# Patient Record
Sex: MALE | Race: WHITE | Employment: OTHER | URBAN - METROPOLITAN AREA
[De-identification: names, ages, dates, MRNs, and addresses within clinical notes are randomized per-mention and may not be internally consistent; named-entity substitution may affect disease eponyms.]

---

## 2019-07-02 ENCOUNTER — OFFICE VISIT (OUTPATIENT)
Dept: ONCOLOGY | Age: 69
End: 2019-07-02

## 2019-07-02 ENCOUNTER — HOSPITAL ENCOUNTER (OUTPATIENT)
Dept: INFUSION THERAPY | Age: 69
Discharge: HOME | End: 2019-07-02
Payer: MEDICARE

## 2019-07-02 ENCOUNTER — DOCUMENTATION ONLY (OUTPATIENT)
Dept: ONCOLOGY | Age: 69
End: 2019-07-02

## 2019-07-02 VITALS
WEIGHT: 149.8 LBS | DIASTOLIC BLOOD PRESSURE: 99 MMHG | HEIGHT: 68 IN | BODY MASS INDEX: 22.7 KG/M2 | OXYGEN SATURATION: 98 % | RESPIRATION RATE: 16 BRPM | TEMPERATURE: 98.9 F | SYSTOLIC BLOOD PRESSURE: 153 MMHG | HEART RATE: 106 BPM

## 2019-07-02 VITALS
RESPIRATION RATE: 18 BRPM | HEART RATE: 81 BPM | OXYGEN SATURATION: 97 % | DIASTOLIC BLOOD PRESSURE: 100 MMHG | SYSTOLIC BLOOD PRESSURE: 166 MMHG | TEMPERATURE: 97.4 F

## 2019-07-02 DIAGNOSIS — C65.1 RENAL PELVIS TRANSITIONAL CELL MALIGNANT NEOPLASM, RIGHT (HCC): Primary | ICD-10-CM

## 2019-07-02 DIAGNOSIS — N18.9 CHRONIC KIDNEY DISEASE, UNSPECIFIED CKD STAGE: ICD-10-CM

## 2019-07-02 DIAGNOSIS — Z21 ASYMPTOMATIC HIV INFECTION (HCC): ICD-10-CM

## 2019-07-02 LAB
ALBUMIN SERPL-MCNC: 4.2 G/DL (ref 3.5–5)
ALBUMIN/GLOB SERPL: 1.2 {RATIO} (ref 1.1–2.2)
ALP SERPL-CCNC: 63 U/L (ref 45–117)
ALT SERPL-CCNC: 37 U/L (ref 12–78)
ANION GAP SERPL CALC-SCNC: 7 MMOL/L (ref 5–15)
AST SERPL-CCNC: 27 U/L (ref 15–37)
BASOPHILS # BLD: 0 K/UL (ref 0–0.1)
BASOPHILS NFR BLD: 1 % (ref 0–1)
BILIRUB SERPL-MCNC: 0.5 MG/DL (ref 0.2–1)
BUN SERPL-MCNC: 27 MG/DL (ref 6–20)
BUN/CREAT SERPL: 15 (ref 12–20)
CALCIUM SERPL-MCNC: 10 MG/DL (ref 8.5–10.1)
CHLORIDE SERPL-SCNC: 104 MMOL/L (ref 97–108)
CO2 SERPL-SCNC: 28 MMOL/L (ref 21–32)
CREAT SERPL-MCNC: 1.83 MG/DL (ref 0.7–1.3)
DIFFERENTIAL METHOD BLD: ABNORMAL
EOSINOPHIL # BLD: 0.1 K/UL (ref 0–0.4)
EOSINOPHIL NFR BLD: 2 % (ref 0–7)
ERYTHROCYTE [DISTWIDTH] IN BLOOD BY AUTOMATED COUNT: 13.8 % (ref 11.5–14.5)
GLOBULIN SER CALC-MCNC: 3.6 G/DL (ref 2–4)
GLUCOSE SERPL-MCNC: 101 MG/DL (ref 65–100)
HCT VFR BLD AUTO: 41.1 % (ref 36.6–50.3)
HGB BLD-MCNC: 13.8 G/DL (ref 12.1–17)
IMM GRANULOCYTES # BLD AUTO: 0 K/UL (ref 0–0.04)
IMM GRANULOCYTES NFR BLD AUTO: 0 % (ref 0–0.5)
LYMPHOCYTES # BLD: 1.9 K/UL (ref 0.8–3.5)
LYMPHOCYTES NFR BLD: 36 % (ref 12–49)
MCH RBC QN AUTO: 32.5 PG (ref 26–34)
MCHC RBC AUTO-ENTMCNC: 33.6 G/DL (ref 30–36.5)
MCV RBC AUTO: 96.7 FL (ref 80–99)
MONOCYTES # BLD: 0.6 K/UL (ref 0–1)
MONOCYTES NFR BLD: 12 % (ref 5–13)
NEUTS SEG # BLD: 2.7 K/UL (ref 1.8–8)
NEUTS SEG NFR BLD: 49 % (ref 32–75)
NRBC # BLD: 0 K/UL (ref 0–0.01)
NRBC BLD-RTO: 0 PER 100 WBC
PLATELET # BLD AUTO: 199 K/UL (ref 150–400)
PMV BLD AUTO: 8.6 FL (ref 8.9–12.9)
POTASSIUM SERPL-SCNC: 4 MMOL/L (ref 3.5–5.1)
PROT SERPL-MCNC: 7.8 G/DL (ref 6.4–8.2)
RBC # BLD AUTO: 4.25 M/UL (ref 4.1–5.7)
SODIUM SERPL-SCNC: 139 MMOL/L (ref 136–145)
WBC # BLD AUTO: 5.4 K/UL (ref 4.1–11.1)

## 2019-07-02 PROCEDURE — 85025 COMPLETE CBC W/AUTO DIFF WBC: CPT

## 2019-07-02 PROCEDURE — 36415 COLL VENOUS BLD VENIPUNCTURE: CPT

## 2019-07-02 PROCEDURE — 80053 COMPREHEN METABOLIC PANEL: CPT

## 2019-07-02 RX ORDER — EMTRICITABINE, RILPIVIRINE HYDROCHLORIDE, AND TENOFOVIR ALAFENAMIDE 200; 25; 25 MG/1; MG/1; MG/1
TABLET ORAL
Refills: 11 | COMMUNITY
Start: 2019-06-26

## 2019-07-02 NOTE — PROGRESS NOTES
[facility] at [address], [address] [place]: [phone]  F: [phone]      Reason for Visit:   Alma Sena is a 70 y.o. male who is seen in consultation at the request of Dr. Vianey Abdi for evaluation of urothelial cell carcinoma of right renal pelvis. Treatment History:   · Right ureteroscopy 12/18/2018: low grade non-invasive urothelial carcinoma  · Right ureteroscopy 4/10/2019: large volume of recurrent tumor throughout lower poly calyces, pathology with urothelial cell carcinoma, low grade  · Laparoscopic right nephroureteroectomy 5/1/2019: urothelial carcinoma, high-grade, 3.2cm, negative margins, no nodes samples  · Stage III (pT3 cN0 M0) urothelial carcinoma of right renal pelvis    History of Present Illness:   Alma Sena is a pleasant 70 y.o. male who presents today for evaluation of urothelial carcinoma. He reports back in October developing diarrhea and abdominal pain while traveling to [place] and [place]. He figured it was something he ate, but the symptoms only worsened when he returned to the [address] [place] [address]. A CT scan revealed diverticulitis, which was treated, and his symptoms resolved. About a month later he received a call that the CT also noted some abnormality in his kidney, and he was referred to urology. A ureteroscopy revealed a low-grade non-invasive carcinoma, and he was placed on close surveillance. Follow up ureteroscopy demonstrated new large volume disease, now invasive. He then had a right nephroureterectomy which revealed high grade disease. He took some time off after surgery to travel to [facility], including a week long voyage on the [address] [place] [address]. He is now here to discuss further options for management. He reports feeling well. No pain. Energy has been fair. No blood in urine.     He notes seeing pulmonary in consultation after his CT scan done last year, due to an abnormality in his lung that was determined to be secondary to prior infection. He reports a history of HIV, managed by Dr. Rolan Smith at [facility]. He reports his viral load has been undetectable recently. He is accompanied by his  today. He lives [place] in the [facility]. He is a retired . Past Medical History:   Diagnosis Date    HIV (human immunodeficiency virus infection) ([address]     Renal pelvis transitional cell malignant neoplasm, right [facility])       Past Surgical History:   Procedure Laterality Date    HX NEPHRECTOMY Right 05/01/2019      Social History     Tobacco Use    Smoking status: Never Smoker    Smokeless tobacco: Never Used   Substance Use Topics    Alcohol use: Yes     Alcohol/week: 4.2 oz     Types: 7 Glasses of wine per week      History reviewed. No pertinent family history. Current Outpatient Medications   Medication Sig    ODEFSEY 200-25-25 mg tab TAKE 1 TABLET BY MOUTH ONCE DAILY     No current facility-administered medications for this visit. Allergies   Allergen Reactions    Mushroom Nausea and Vomiting        Review of Systems: A complete review of systems was obtained, negative except as described above. Physical Exam:     Visit Vitals  BP (!) 153/99 (BP 1 Location: Left arm, BP Patient Position: Sitting)   Pulse (!) 106   Temp 98.9 °F (37.2 °C) (Oral)   Resp 16   Ht 5' 8\" (1.727 m)   Wt 149 lb 12.8 oz (67.9 kg)   SpO2 98%   BMI 22.78 kg/m²     ECOG PS: 0  General: No distress  Eyes: PERRLA, anicteric sclerae  HENT: Atraumatic, OP clear  Neck: Supple  Lymphatic: No cervical, supraclavicular, or inguinal adenopathy  Respiratory: CTAB, normal respiratory effort  CV: Normal rate, regular rhythm, no murmurs, no peripheral edema  GI: Soft, nontender, nondistended, no masses, no hepatomegaly, no splenomegaly  MS: Normal gait and station. Digits without clubbing or cyanosis. Skin: No rashes, ecchymoses, or petechiae. Normal temperature, turgor, and texture.   Psych: Alert, oriented, appropriate affect, normal judgment/insight    Results:     Lab Results   Component Value Date/Time    WBC 5.4 07/02/2019 03:52 PM    HGB 13.8 07/02/2019 03:52 PM    HCT 41.1 07/02/2019 03:52 PM    PLATELET 416 15/69/8379 03:52 PM    MCV 96.7 07/02/2019 03:52 PM    ABS. NEUTROPHILS 2.7 07/02/2019 03:52 PM     Lab Results   Component Value Date/Time    Sodium 139 07/02/2019 03:52 PM    Potassium 4.0 07/02/2019 03:52 PM    Chloride 104 07/02/2019 03:52 PM    CO2 28 07/02/2019 03:52 PM    Glucose 101 (H) 07/02/2019 03:52 PM    BUN 27 (H) 07/02/2019 03:52 PM    Creatinine 1.83 (H) 07/02/2019 03:52 PM    GFR est AA 45 (L) 07/02/2019 03:52 PM    GFR est non-AA 37 (L) 07/02/2019 03:52 PM    Calcium 10.0 07/02/2019 03:52 PM     Lab Results   Component Value Date/Time    Bilirubin, total 0.5 07/02/2019 03:52 PM    ALT (SGPT) 37 07/02/2019 03:52 PM    AST (SGOT) 27 07/02/2019 03:52 PM    Alk. phosphatase 63 07/02/2019 03:52 PM    Protein, total 7.8 07/02/2019 03:52 PM    Albumin 4.2 07/02/2019 03:52 PM    Globulin 3.6 07/02/2019 03:52 PM         6/5/2019  Creatinine: 1.83    Records reviewed and summarized above. Pathology report(s) reviewed above. Radiology report(s) reviewed above. Assessment:   1) Urothelial carcinoma of right renal pelvis  Stage III  He is s/p right nephroureterectomy 5/1/2019. Management is with curative intent. He was found to have a high-grade T3 tumor, which certainly increases his risk of recurrence. We discussed the potential benefit of adjuvant chemotherapy, which is supported as an option by NCCN guidelines for patients with high risk upper tract urothelial carcinoma. Evidence from POUT trial presented in 2018 demonstrated a 20% improvement in 2-year DFS, though this was a small study which has not yet been published and has not yet firmly changed standard of care in the [address].     We spoke about adjuvant chemotherapy at great length, and he expressed an interest in moving forward with this. However, I recommended first obtaining labwork to ensure adequate renal function. Unfortunately, after he left his creatinine returned elevated today at 1.83. His creatinine was the same level when checked by urology on 6/5, suggesting this is his new baseline post-nephrectomy. In light of this finding, I do not think he is a good candidate for adjuvant cisplatin based therapy. There is not good evidence for carboplatin in this setting, and there is some concern about toxicity of carboplatin given his solitary kidney and his HIV diagnosis. I reviewed his case with a urologic oncology expert in [facility], and he agrees that chemotherapy is not indicated in this situation. I called the patient and reviewed all of this with him. I recommend he proceed with surveillance with Dr. Summer Sherman. He should obtain follow up CT imaging sometime soon, as he has not had any imaging since late last year. If he is found to have metastatic disease, he should return to see me to discuss palliative chemotherapy or immunotherapy. 2) HIV  Managed by Dr. Rolo Alberto at [address] [place] [address]    3) CKD  Creatinine of 1.83 post-nephrectomy. Consider nephrology evaluation, reviewed with Dr. Summer Sherman and he will initiate. Plan:     · Labs today: CBC, CMP  · CT C/A/P sometime soon, [facility]  · Follow up with Dr. Summer Sherman for surveillance  · Nephrology referral (urology to initiate)  ·  to meet with patient today, provide resources and counseling  · Return to see me as needed    I appreciate the opportunity to participate in Mr. Mauro ortiz.     Signed By: Bea Castellon MD

## 2019-07-02 NOTE — PROGRESS NOTES
[facility] Lab Visit:    0632 Pt arrived ambulatory and in no distress, labs drawn one stick 1552 in Right AC PER [facility]  . blood pressure high just check at doctors prior  Departed [facility] ambulatory and in no distress. Visit Vitals  BP (!) 166/100 (BP 1 Location: Right arm, BP Patient Position: Sitting)   Pulse 81   Temp 97.4 °F (36.3 °C)   Resp 18   SpO2 97%       Labs available in CC once resulted.

## 2019-07-02 NOTE — PROGRESS NOTES
1. Have you been to the ER, urgent care clinic since your last visit? Hospitalized since your last visit? No    2. Have you seen or consulted any other health care providers outside of the [address] [place] [address] since your last visit? Include any pap smears or colon screening.  No

## 2019-07-03 ENCOUNTER — HOSPITAL ENCOUNTER (OUTPATIENT)
Dept: INFUSION THERAPY | Age: 69
End: 2019-07-03

## 2019-07-03 NOTE — PROGRESS NOTES
Oncology Navigator  Psychosocial Assessment    Reason for Assessment:    []Depression  []Anxiety  []Caregiver [place]  []Maladaptive Coping with Serious Illness   [] Social Work Referral [x] Initial Assessment  [x] Other newly dx renal cell carcinoma    Sources of Information:    [x]Patient  [x]Family  [x]Staff  [x]Medical Record    Advance Care Planning: Patient has AMD; Encouraged him to bring copy to scan into chart. No flowsheet data found.     Mental Status:    [x]Alert  []Lethargic  []Unresponsive   [] Unable to assess   Oriented to:  [x]Person  [x]Place  [x]Time  [x]Situation      Barriers to Learning:    []Language  []Developmental  []Cognitive  []Altered Mental Status  []Visual/Hearing Impairment  []Unable to Read/Write  []Motivational   [x]No Barriers Identified  []Other:    Relationship Status:  []Single  [x]  []Significant Other/Life Partner  []  []  []      Living Circumstances:  []Lives Alone  [x]Family/Significant Other in Household  []Roommates  []Children in the Home  []Paid Caregivers  []Assisted Living Facility/Group Home  []Skilled [address]  []Homeless  []Incarcerated  []Environmental/Care Concerns  []other:    Employment Status:  []Employed Full-time []Employed Part-time []Homemaker [] Disabled  [x] Retired []Other:    Support System:    [x]Strong  []Fair  []Limited    Financial/Legal Concerns:    []Uninsured  []Limited Income/Resources  []Non-Citizen  [x]No Concerns Identified  []Financial POA:    []Other:    Scientologist/Spiritual/Existential:  []Strong Sense of Spirituality  []Involved in [employer]  []Request  Visit  []Expressing Catie English  [x]No Concerns Identified    Coping with Illness:         Patient: Family/Caregiver:   Understanding and Acceptance of Illness/Prognosis  [x] [x]   Strong Sense of Resilience [x] []   Self Reflection [x] []   Engaged Support System [] []   Does not Readily Discuss Illness [] []   Denial of Terminal Status [] []   Anger [] []   Depression [] []   Anxiety/Fear [] []   Bargaining [] []   Recent Diagnosis/Prognosis [x] []   Difficulties with Body Image [] []   Loss of Identity [] []   Excessive Substance Use [] []   Mental Health History [] []   Enmeshed Relationships [] []   History of Loss [] []   Anticipatory Grief [] []   Concern for Complicated Grief [] []   Suicidal Ideation or Plan [] []   Unable to assess [] []            Narrative: Met with patient and his  to introduce social work navigator role and supports. Patient is a retired . He frequently travels internationally. He has adequate transportation to appointments and no financial barriers identified. Patient was talkative, volunteered feelings/information freely and initiated conversation comfortably. Patient tells me he has tremendous support from his , neighbors and friends. Offered supportive listening as he ventilates feelings regarding diagnosis and treatment. Patient tells me he's \"coping well\" although feeling initially surprised when diagnosed. Acknowledged and normalized patient's feelings. Provided reassurance that we are here to support him throughout this process. Reviewed supportive resources offered at [address] [place] [address]. Patient voiced understanding and appreciation. Assessment/Action:   1. Patient is actively coping with diagnosis and treatment. He is well supported with practical and emotional needs by his  and close neighbor friends. 2. Reviewed barriers to care and none identified at this time. It's noted patient has several upcomming trips planned and desires to still go on them is able to coordinate around treatment. 3. Provided support group information. 4. Ongoing psychosocial support to include ongoing assessment of adjustment to diagnosis and treatment, supportive counseling and resource linkage.     Plan/Referral:   Support Groups referral      Thank you,  Kaleb Strauss LCSW

## 2019-08-07 ENCOUNTER — HOSPITAL ENCOUNTER (OUTPATIENT)
Dept: ULTRASOUND IMAGING | Age: 69
Discharge: HOME | End: 2019-08-07
Attending: INTERNAL MEDICINE
Payer: MEDICARE

## 2019-08-07 DIAGNOSIS — N18.30 CHRONIC KIDNEY DISEASE, STAGE III (MODERATE) (HCC): ICD-10-CM

## 2019-08-07 PROCEDURE — 76770 US EXAM ABDO BACK WALL COMP: CPT
